# Patient Record
Sex: FEMALE | Race: OTHER | HISPANIC OR LATINO | URBAN - METROPOLITAN AREA
[De-identification: names, ages, dates, MRNs, and addresses within clinical notes are randomized per-mention and may not be internally consistent; named-entity substitution may affect disease eponyms.]

---

## 2023-07-31 ENCOUNTER — INPATIENT HOSPITAL (OUTPATIENT)
Dept: URBAN - METROPOLITAN AREA HOSPITAL 50 | Facility: HOSPITAL | Age: 73
End: 2023-07-31
Payer: MEDICARE

## 2023-07-31 DIAGNOSIS — D64.9 ANEMIA, UNSPECIFIED: ICD-10-CM

## 2023-07-31 DIAGNOSIS — R19.7 DIARRHEA, UNSPECIFIED: ICD-10-CM

## 2023-07-31 DIAGNOSIS — R11.2 NAUSEA WITH VOMITING, UNSPECIFIED: ICD-10-CM

## 2023-07-31 DIAGNOSIS — R93.3 ABNORMAL FINDINGS ON DIAGNOSTIC IMAGING OF OTHER PARTS OF DI: ICD-10-CM

## 2023-07-31 DIAGNOSIS — R10.84 GENERALIZED ABDOMINAL PAIN: ICD-10-CM

## 2023-07-31 PROCEDURE — 99222 1ST HOSP IP/OBS MODERATE 55: CPT | Performed by: CLINICAL NURSE SPECIALIST

## 2023-08-01 ENCOUNTER — INPATIENT HOSPITAL (OUTPATIENT)
Dept: URBAN - METROPOLITAN AREA HOSPITAL 50 | Facility: HOSPITAL | Age: 73
End: 2023-08-01
Payer: COMMERCIAL

## 2023-08-01 DIAGNOSIS — R19.7 DIARRHEA, UNSPECIFIED: ICD-10-CM

## 2023-08-01 DIAGNOSIS — R11.2 NAUSEA WITH VOMITING, UNSPECIFIED: ICD-10-CM

## 2023-08-01 DIAGNOSIS — R10.84 GENERALIZED ABDOMINAL PAIN: ICD-10-CM

## 2023-08-01 DIAGNOSIS — R93.3 ABNORMAL FINDINGS ON DIAGNOSTIC IMAGING OF OTHER PARTS OF DI: ICD-10-CM

## 2023-08-01 PROCEDURE — 99233 SBSQ HOSP IP/OBS HIGH 50: CPT | Performed by: NURSE PRACTITIONER

## 2023-08-02 PROCEDURE — 99233 SBSQ HOSP IP/OBS HIGH 50: CPT | Performed by: NURSE PRACTITIONER

## 2023-08-03 ENCOUNTER — INPATIENT HOSPITAL (OUTPATIENT)
Dept: URBAN - METROPOLITAN AREA HOSPITAL 50 | Facility: HOSPITAL | Age: 73
End: 2023-08-03
Payer: COMMERCIAL

## 2023-08-03 DIAGNOSIS — R93.3 ABNORMAL FINDINGS ON DIAGNOSTIC IMAGING OF OTHER PARTS OF DI: ICD-10-CM

## 2023-08-03 DIAGNOSIS — R19.7 DIARRHEA, UNSPECIFIED: ICD-10-CM

## 2023-08-03 DIAGNOSIS — R10.84 GENERALIZED ABDOMINAL PAIN: ICD-10-CM

## 2023-08-03 DIAGNOSIS — R11.2 NAUSEA WITH VOMITING, UNSPECIFIED: ICD-10-CM

## 2023-08-03 PROCEDURE — 99233 SBSQ HOSP IP/OBS HIGH 50: CPT | Performed by: NURSE PRACTITIONER

## 2025-06-24 ENCOUNTER — NURSING HOME VISIT (OUTPATIENT)
Dept: POST ACUTE CARE | Facility: EXTERNAL LOCATION | Age: 75
End: 2025-06-24
Payer: MEDICARE

## 2025-06-24 DIAGNOSIS — E87.0 HYPERNATREMIA: ICD-10-CM

## 2025-06-24 DIAGNOSIS — I21.A1 MYOCARDIAL INFARCTION TYPE 2 (MULTI): ICD-10-CM

## 2025-06-24 DIAGNOSIS — E11.00 HYPEROSMOLAR HYPERGLYCEMIC STATE (HHS) (MULTI): Primary | ICD-10-CM

## 2025-06-24 DIAGNOSIS — N17.9 AKI (ACUTE KIDNEY INJURY): ICD-10-CM

## 2025-06-24 DIAGNOSIS — C34.90 METASTATIC NON-SMALL CELL LUNG CANCER (MULTI): ICD-10-CM

## 2025-06-24 PROCEDURE — 99306 1ST NF CARE HIGH MDM 50: CPT | Performed by: EMERGENCY MEDICINE

## 2025-06-24 PROCEDURE — 99497 ADVNCD CARE PLAN 30 MIN: CPT | Performed by: EMERGENCY MEDICINE

## 2025-06-24 NOTE — LETTER
Patient: Kasie Zarate  : 1950    Encounter Date: 2025    Kasie Zarate   74 y.o.  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 Refills: 0. insulin glargine (Lantus Solostar Pen 100 units/mL subcutaneous solution)10 Units Subcutaneous DAILY. Refills: 0. insulin lispro (Humalog)6 Units Subcutaneous TWICE A DAY BEFORE MEALS. Refills: 0. insulin lispro (HumaLOG)Mild Scale Subcutaneous THREE TIMES A DAY BEFORE MEALS. << Sliding Scale Comments >>  0 Units; 151-200 2 Units; 201-250 4 Units; 251-300 6 Units; 301-350 8 Units; 351-400 10 Units; Greater than 400 12 Units << Sliding Scale Comments >>. insulin lispro (Humalog)4 Units Subcutaneous DAILY WITH EVENING MEAL. Refills: 0. warfarin = Jantoven, Coumadin (warfarin 1 mg oral tablet)1 Tabs Oral AT BEDTIME. Refills: 0.   Changed dexamethasone = Decadron (dexAMETHasone 1 mg oral tablet)Take 1 tab daily for 5 days then 0.5 tab daily for 5 days then discontinue. Refills: 0. levothyroxine (levothyroxine 100 mcg (0.1 mg) oral tablet)1 Tabs Oral DAILY.   Unchanged acetaminophen (acetaminophen 500 mg oral tablet)2 Tabs Oral EVERY FOUR HOURS as needed as needed for pain.   Discontinued atorvastatin (atorvastatin 10 mg oral tablet)1 Tabs Oral DAILY. fentaNYL (fentaNYL 12 mcg/hr transdermal film, extended release)1  Patches Topical EVERY SEVENTYTWO HOURS. hydrochlorothiazide-lisinopril (hydrochlorothiazide-lisinopril 25 mg-20 mg oral tablet)1 Tabs Oral DAILY. metFORMIN (metFORMIN 500 mg oral tablet)1 Tabs Oral DAILY. with meals. metoprolol (metoprolol succinate 25 mg oral tablet, extended release)0.5 Tabs Oral TWICE A DAY. traMADol (traMADol 50 mg oral tablet)1 Tabs Oral EVERY TWELVE HOURS as needed for pain. Hospital Course     74 year old female with hx of lung cancer with brain mets ([facility]) dx'ed 5 yrs ago currently getting radiation presented to the ED for weakness, dehydration, no appetite. Hx obtained from  at bedside. States for the past 3 days she has been lethargic, not getting out of bed. She has increased urination, not eating or drinking. He denies fevers, chills, cough. She did vomit in the ER.     Hospital course: Found to have DKA/HHS, started on insulin drip, endocrine consulted. Was admitted to the ICU. Started on broad spectrum abx, found to have E coli bacteremia and UTI. Started on CTX, ID consulted. She also had SURYA and hypernatremia which resolved with IVFs. Patient's glucose improved and her mentation returned to baseline, she was transitioned to basal bolus insulin per endocrine. Found to have remote lacunar infarct in the right corona radiata. Echo showed new finding of LV apical thrombus. Cardio recommends Lovenox to Coumadin bridge, she is now therapeutic on Warfarin and Lovenox discontinued, will need frequent INR monitoring at discharge and follow up in Coumadin clinic. Her repeat blood cultures were negative, she completed 10 days of IV antibiotics. Patient is on dexamethasone in the setting of her brain metastasis and recent brain radiation, this will be gradually weaned off at discharge. PT/OT recs SNF, pt is agreeable.     I discussed advanced care planning for more than 16 minutes including the explanation and discussion of advanced directives. Information and advise was also provided on  DO NOT RESUSCITATE and patient encouraged to consider this  Patient is not sure about DNR at this time.      Source of history: Nurse, Medical personnel, Medical record, Patient.  History limitation: None.    HPI:  History and physical    Patient is unable to give any detailed history and therefore history is obtained from the chart  No acute complaints voiced by the patient or acute concerns raised by nursing    History of hospitalization-    History of Present Illness 74 year old female with hx of lung cancer with brain mets ([facility]) dx'ed 5 yrs ago currently getting radiation only presented to the ED for weakness, dehydration, no appetite. Hx obtained from  at bedside. States for the past 3 days she has been lethargic, not getting out of bed. She has increased urination, not eating or drinking. He denies fevers, chills, cough. She did vomit in the ER.     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 Change in mental status Colon polyps Diabetic ketoacidosis Hypernatremia Lung cancer metastatic to brain Metastasis to brain Myocardial infarction type 2 Non-small cell lung cancer Urinary tract infection Volume depletion Weakness Procedure/Surgical History Colonoscopy.: 11/08/19    Allergies No Known Medication Allergies Social History   Alcohol - Denies Alcohol Use   Substance Abuse - Denies Substance Abuse   Tobacco Use:Never (less than 100 in lifetime) Family History Family history is negative  Current Medications[1]    Physical Exam:  Vital signs as per nursing/MA documentation were reviewed  General appearance: Alert and in no acute distress  HEENT: Normal Inspection  Neck - Normal Inspection  Respiratory : No respiratory distress. Lungs are clear   Cardiovascular: heart rate normal. No gallop  Back - normal inspection  Skin inspection:Warm  Musculoskeletal : No deformities  Neuro : Limited exam. Baseline    ROS: Review of symptoms is  negative except for what is mentioned in HPI    Results/Data  Records including but not limited to electronic medical records, relevant lab work and imaging from patient's health care facility encounter were reviewed and independently verified      Charting was completed using voice recognition technology and may include unintended errors.    Discussed with patient/family, RN, and NP.      Electronically Signed By: Kurt Cortez MD   7/1/25  5:25 PM       [1]  No current outpatient medications on file.     No current facility-administered medications for this visit.

## 2025-07-01 PROBLEM — E11.00 HYPEROSMOLAR HYPERGLYCEMIC STATE (HHS) (MULTI): Status: ACTIVE | Noted: 2025-07-01

## 2025-07-01 PROBLEM — E87.0 HYPERNATREMIA: Status: ACTIVE | Noted: 2025-07-01

## 2025-07-01 PROBLEM — I21.A1 MYOCARDIAL INFARCTION TYPE 2 (MULTI): Status: ACTIVE | Noted: 2025-07-01

## 2025-07-01 PROBLEM — C34.90 METASTATIC NON-SMALL CELL LUNG CANCER (MULTI): Status: ACTIVE | Noted: 2025-07-01

## 2025-07-01 PROBLEM — N17.9 AKI (ACUTE KIDNEY INJURY): Status: ACTIVE | Noted: 2025-07-01

## 2025-07-01 NOTE — PROGRESS NOTES
Kasie Zarate   74 y.o.  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 Refills: 0. insulin glargine (Lantus Solostar Pen 100 units/mL subcutaneous solution)10 Units Subcutaneous DAILY. Refills: 0. insulin lispro (Humalog)6 Units Subcutaneous TWICE A DAY BEFORE MEALS. Refills: 0. insulin lispro (HumaLOG)Mild Scale Subcutaneous THREE TIMES A DAY BEFORE MEALS. << Sliding Scale Comments >>  0 Units; 151-200 2 Units; 201-250 4 Units; 251-300 6 Units; 301-350 8 Units; 351-400 10 Units; Greater than 400 12 Units << Sliding Scale Comments >>. insulin lispro (Humalog)4 Units Subcutaneous DAILY WITH EVENING MEAL. Refills: 0. warfarin = Jantoven, Coumadin (warfarin 1 mg oral tablet)1 Tabs Oral AT BEDTIME. Refills: 0.   Changed dexamethasone = Decadron (dexAMETHasone 1 mg oral tablet)Take 1 tab daily for 5 days then 0.5 tab daily for 5 days then discontinue. Refills: 0. levothyroxine (levothyroxine 100 mcg (0.1 mg) oral tablet)1 Tabs Oral DAILY.   Unchanged acetaminophen (acetaminophen 500 mg oral tablet)2 Tabs Oral EVERY FOUR HOURS as needed as needed for pain.   Discontinued atorvastatin (atorvastatin 10 mg oral tablet)1 Tabs Oral DAILY. fentaNYL (fentaNYL 12 mcg/hr transdermal film, extended release)1 Patches Topical EVERY SEVENTYTWO HOURS. hydrochlorothiazide-lisinopril  (hydrochlorothiazide-lisinopril 25 mg-20 mg oral tablet)1 Tabs Oral DAILY. metFORMIN (metFORMIN 500 mg oral tablet)1 Tabs Oral DAILY. with meals. metoprolol (metoprolol succinate 25 mg oral tablet, extended release)0.5 Tabs Oral TWICE A DAY. traMADol (traMADol 50 mg oral tablet)1 Tabs Oral EVERY TWELVE HOURS as needed for pain. Hospital Course     74 year old female with hx of lung cancer with brain mets ([facility]) dx'ed 5 yrs ago currently getting radiation presented to the ED for weakness, dehydration, no appetite. Hx obtained from  at bedside. States for the past 3 days she has been lethargic, not getting out of bed. She has increased urination, not eating or drinking. He denies fevers, chills, cough. She did vomit in the ER.     Hospital course: Found to have DKA/HHS, started on insulin drip, endocrine consulted. Was admitted to the ICU. Started on broad spectrum abx, found to have E coli bacteremia and UTI. Started on CTX, ID consulted. She also had SURYA and hypernatremia which resolved with IVFs. Patient's glucose improved and her mentation returned to baseline, she was transitioned to basal bolus insulin per endocrine. Found to have remote lacunar infarct in the right corona radiata. Echo showed new finding of LV apical thrombus. Cardio recommends Lovenox to Coumadin bridge, she is now therapeutic on Warfarin and Lovenox discontinued, will need frequent INR monitoring at discharge and follow up in Coumadin clinic. Her repeat blood cultures were negative, she completed 10 days of IV antibiotics. Patient is on dexamethasone in the setting of her brain metastasis and recent brain radiation, this will be gradually weaned off at discharge. PT/OT recs SNF, pt is agreeable.     I discussed advanced care planning for more than 16 minutes including the explanation and discussion of advanced directives. Information and advise was also provided on DO NOT RESUSCITATE and patient encouraged to consider this  Patient is  not sure about DNR at this time.      Source of history: Nurse, Medical personnel, Medical record, Patient.  History limitation: None.    HPI:  History and physical    Patient is unable to give any detailed history and therefore history is obtained from the chart  No acute complaints voiced by the patient or acute concerns raised by nursing    History of hospitalization-    History of Present Illness 74 year old female with hx of lung cancer with brain mets ([facility]) dx'ed 5 yrs ago currently getting radiation only presented to the ED for weakness, dehydration, no appetite. Hx obtained from  at bedside. States for the past 3 days she has been lethargic, not getting out of bed. She has increased urination, not eating or drinking. He denies fevers, chills, cough. She did vomit in the ER.     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 Change in mental status Colon polyps Diabetic ketoacidosis Hypernatremia Lung cancer metastatic to brain Metastasis to brain Myocardial infarction type 2 Non-small cell lung cancer Urinary tract infection Volume depletion Weakness Procedure/Surgical History Colonoscopy.: 11/08/19    Allergies No Known Medication Allergies Social History   Alcohol - Denies Alcohol Use   Substance Abuse - Denies Substance Abuse   Tobacco Use:Never (less than 100 in lifetime) Family History Family history is negative  Current Medications[1]    Physical Exam:  Vital signs as per nursing/MA documentation were reviewed  General appearance: Alert and in no acute distress  HEENT: Normal Inspection  Neck - Normal Inspection  Respiratory : No respiratory distress. Lungs are clear   Cardiovascular: heart rate normal. No gallop  Back - normal inspection  Skin inspection:Warm  Musculoskeletal : No deformities  Neuro : Limited exam. Baseline    ROS: Review of symptoms is negative except for what is mentioned in HPI    Results/Data  Records  including but not limited to electronic medical records, relevant lab work and imaging from patient's health care facility encounter were reviewed and independently verified      Charting was completed using voice recognition technology and may include unintended errors.    Discussed with patient/family, RN, and NP.       [1]   No current outpatient medications on file.     No current facility-administered medications for this visit.